# Patient Record
Sex: MALE | Race: BLACK OR AFRICAN AMERICAN | NOT HISPANIC OR LATINO | Employment: UNEMPLOYED | URBAN - METROPOLITAN AREA
[De-identification: names, ages, dates, MRNs, and addresses within clinical notes are randomized per-mention and may not be internally consistent; named-entity substitution may affect disease eponyms.]

---

## 2017-08-24 ENCOUNTER — HOSPITAL ENCOUNTER (EMERGENCY)
Facility: CLINIC | Age: 22
Discharge: HOME | End: 2017-08-25
Attending: EMERGENCY MEDICINE | Admitting: EMERGENCY MEDICINE
Payer: COMMERCIAL

## 2017-08-24 DIAGNOSIS — F15.10 METHAMPHETAMINE ABUSE (H): ICD-10-CM

## 2017-08-24 LAB
ALCOHOL BREATH TEST: 0 (ref 0–0.01)
AMPHETAMINES UR QL SCN: POSITIVE
BARBITURATES UR QL: NEGATIVE
BENZODIAZ UR QL: NEGATIVE
CANNABINOIDS UR QL SCN: NEGATIVE
COCAINE UR QL: NEGATIVE
OPIATES UR QL SCN: NEGATIVE
PCP UR QL SCN: NEGATIVE

## 2017-08-24 PROCEDURE — 82075 ASSAY OF BREATH ETHANOL: CPT

## 2017-08-24 PROCEDURE — 96361 HYDRATE IV INFUSION ADD-ON: CPT

## 2017-08-24 PROCEDURE — 99283 EMERGENCY DEPT VISIT LOW MDM: CPT | Mod: 25

## 2017-08-24 PROCEDURE — 80307 DRUG TEST PRSMV CHEM ANLYZR: CPT | Performed by: EMERGENCY MEDICINE

## 2017-08-24 PROCEDURE — 25000128 H RX IP 250 OP 636: Performed by: EMERGENCY MEDICINE

## 2017-08-24 PROCEDURE — 96360 HYDRATION IV INFUSION INIT: CPT

## 2017-08-24 RX ORDER — LORAZEPAM 2 MG/ML
1 INJECTION INTRAMUSCULAR ONCE
Status: DISCONTINUED | OUTPATIENT
Start: 2017-08-24 | End: 2017-08-25 | Stop reason: HOSPADM

## 2017-08-24 RX ADMIN — SODIUM CHLORIDE 1000 ML: 9 INJECTION, SOLUTION INTRAVENOUS at 22:44

## 2017-08-24 ASSESSMENT — ENCOUNTER SYMPTOMS
HYPERACTIVE: 0
CONFUSION: 1
TREMORS: 1
AGITATION: 0
NERVOUS/ANXIOUS: 1
NAUSEA: 0
HALLUCINATIONS: 0
DYSPHORIC MOOD: 1
VOMITING: 0

## 2017-08-24 NOTE — ED AVS SNAPSHOT
[facility] Emergency Department    [address]    [facility] [address]    Phone:  [phone]    Fax:  [phone]                                       Solomon Dubon   MRN: [id number]    Department:  [facility] Emergency Department   Date of Visit:  8/24/2017           After Visit Summary Signature Page     I have received my discharge instructions, and my questions have been answered. I have discussed any challenges I see with this plan with the nurse or doctor.    ..........................................................................................................................................  Patient/Patient Representative Signature      ..........................................................................................................................................  Patient Representative Print Name and Relationship to Patient    ..................................................               ................................................  Date                                            Time    ..........................................................................................................................................  Reviewed by Signature/Title    ...................................................              ..............................................  Date                                                            Time

## 2017-08-24 NOTE — ED AVS SNAPSHOT
" [facility] Emergency Department    [address] [place] [address]    Phone:  [phone]    Fax:  [phone]                                       Solomon Dubon   MRN: [id number]    Department:  [facility] Emergency Department   Date of Visit:  8/24/2017           Patient Information     Date Of Birth          1995        Your diagnoses for this visit were:     Methamphetamine abuse        You were seen by Subhash Sawyer MD.      Follow-up Information     Follow up with detox. Call today.        Discharge Instructions         Understanding Methamphetamine Abuse and Addiction  Methamphetamine (also known as meth or crystal meth) is a manmade drug that affects brain function. Over time, it can change the way you think and act. Some of these changes can cause you great distress. And they can disrupt your life. But methamphetamine addiction can be treated. If you or a loved one has a drug problem, tell someone you trust. That is the first step in getting help.    What does methamphetamine do?  Some drugs slow down your system. But methamphetamine speeds it up. In fact, methamphetamine is often known as  speed,  or \"crank,\" Users have increased energy. Some may go days without food or sleep. The drug comes in many forms that users inject, smoke, inhale, or eat. Methamphetamine causes an intense rush that may last from minutes to hours.  What are the risks?  Methamphetamine triggers your brain to release large amounts of the chemical dopamine. This causes feelings of extreme well-being. It may also damage the cells that produce dopamine. This can make it harder to feel pleasure over time. Using methamphetamine may also lead to these problems:    Addiction. This means you develop a strong physical and psychological dependence on the drug. And you may not be able to stop taking it on your own. A potent form of methamphetamine known as  ice  or \"crystal meth\" is even " more addictive.    Overdose. You may need more and more methamphetamine to feel good. But taking too much can lead to seizures or death.    Exposure to HIV. Using shared needles to inject methamphetamine can spread the virus that cause AIDS and hepatitis.    Hallucinations (hearing and seeing things that aren t there).    Paranoia (intense feelings of fear of other people).    Violent action    Bleeding in the brain    Severe dental problems  How can you get help?  In many cases, your healthcare provider can help. Or, check your phone book or the Internet for mental health centers and drug treatment programs. You can also try the resources below.  Resources  Substance Abuse and Mental Health Services Helpline  552.711.2812 (HELP) http://www.samhsa.gov/treatment/   The National [place] on Drug Abuse  832.968.5759  www.drugabuse.gov/drugs-abuse   Crystal Meth Anonymous 223-383-2317 www.crystalmeth.org    Date Last Reviewed: 2/1/2017 2000-2017 [employer]. [address] [place] [address]. All rights reserved. This information is not intended as a substitute for professional medical care. Always follow your healthcare professional's instructions.          24 Hour Appointment Hotline       To make an appointment at any [facility], call [phone] ([phone]). If you don't have a family doctor or clinic, we will help you find one. [facility] clinics are conveniently located to serve the needs of you and your family.             Review of your medicines      Notice     You have not been prescribed any medications.            Procedures and tests performed during your visit     Alcohol breath test POCT    Drug abuse screen 77 urine      Orders Needing Specimen Collection     None      Pending Results     No orders found for last 3 day(s).            Pending Culture Results     No orders found for last 3 day(s).            Pending Results Instructions     If you had any lab results  that were not finalized at the time of your Discharge, you can call the ED Lab Result RN at [phone]. You will be contacted by this team for any positive Lab results or changes in treatment. The nurses are available 7 days a week from 10A to 6:30P.  You can leave a message 24 hours per day and they will return your call.        Test Results From Your Hospital Stay        8/24/2017 11:05 PM      Component Results     Component Value Ref Range & Units Status    Alcohol Breath Test 0.000 0.00 - 0.01 Final         8/24/2017 11:28 PM      Component Results     Component Value Ref Range & Units Status    Amphetamine Qual Urine Positive (A) NEG^Negative Final    Cutoff for a positive amphetamine is greater than 500 ng/mL. This is an   unconfirmed screening result to be used for medical purposes only.      Barbiturates Qual Urine Negative NEG^Negative Final    Cutoff for a negative barbiturate is 200 ng/mL or less.    Benzodiazepine Qual Urine Negative NEG^Negative Final    Cutoff for a negative benzodiazepine is 200 ng/mL or less.    Cannabinoids Qual Urine Negative NEG^Negative Final    Cutoff for a negative cannabinoid is 50 ng/mL or less.    Cocaine Qual Urine Negative NEG^Negative Final    Cutoff for a negative cocaine is 300 ng/mL or less.    Opiates Qualitative Urine Negative NEG^Negative Final    Cutoff for a negative opiate is 300 ng/mL or less.    PCP Qual Urine Negative NEG^Negative Final    Cutoff for a negative PCP is 25 ng/mL or less.                Clinical Quality Measure: Blood Pressure Screening     Your blood pressure was checked while you were in the emergency department today. The last reading we obtained was  BP: 124/82 . Please read the guidelines below about what these numbers mean and what you should do about them.  If your systolic blood pressure (the top number) is less than 120 and your diastolic blood pressure (the bottom number) is less than 80, then your blood pressure is normal. There is  "nothing more that you need to do about it.  If your systolic blood pressure (the top number) is 120-139 or your diastolic blood pressure (the bottom number) is 80-89, your blood pressure may be higher than it should be. You should have your blood pressure rechecked within a year by a primary care provider.  If your systolic blood pressure (the top number) is 140 or greater or your diastolic blood pressure (the bottom number) is 90 or greater, you may have high blood pressure. High blood pressure is treatable, but if left untreated over time it can put you at risk for heart attack, stroke, or kidney failure. You should have your blood pressure rechecked by a primary care provider within the next 4 weeks.  If your provider in the emergency department today gave you specific instructions to follow-up with your doctor or provider even sooner than that, you should follow that instruction and not wait for up to 4 weeks for your follow-up visit.        Thank you for choosing [facility]       Thank you for choosing [facility] for your care. Our goal is always to provide you with excellent care. Hearing back from our patients is one way we can continue to improve our services. Please take a few minutes to complete the written survey that you may receive in the mail after you visit with us. Thank you!        [employer] Information     [employer] lets you send messages to your doctor, view your test results, renew your prescriptions, schedule appointments and more. To sign up, go to [email or web address] . Click on \"Log in\" on the left side of the screen, which will take you to the Welcome page. Then click on \"Sign up Now\" on the right side of the page.     You will be asked to enter the access code listed below, as well as some personal information. Please follow the directions to create your username and password.     Your access code is: [email or web address]  Expires: 2017  5:39 AM     Your access code will  in 90 days. If you " need help or a new code, please call your [facility] clinic or [phone].        Care EveryWhere ID     This is your Care EveryWhere ID. This could be used by other organizations to access your [facility] medical records  [id number]        Equal Access to Services     JEFFERY ROSE : Rishabh Curtis, desi yoo, keerthi kaalgumaro galvan, kamar loja. So [facility] [phone].    ATENCIÓN: Si habla español, tiene a kaur disposición servicios gratuitos de asistencia lingüística. Llame al [phone].    We comply with applicable federal civil rights laws and [place] laws. We do not discriminate on the basis of race, color, national origin, age, disability sex, sexual orientation or gender identity.            After Visit Summary       This is your record. Keep this with you and show to your community pharmacist(s) and doctor(s) at your next visit.

## 2017-08-25 VITALS
DIASTOLIC BLOOD PRESSURE: 80 MMHG | SYSTOLIC BLOOD PRESSURE: 126 MMHG | OXYGEN SATURATION: 99 % | RESPIRATION RATE: 16 BRPM | HEART RATE: 81 BPM

## 2017-08-25 NOTE — ED NOTES
Bed: ED15  Expected date: 8/24/17  Expected time: 9:30 PM  Means of arrival: Ambulance  Comments:  Shavon August

## 2017-08-25 NOTE — ED NOTES
Bed: ED04  Expected date: 8/24/17  Expected time: 10:04 PM  Means of arrival:   Comments:  Room 15?

## 2017-08-25 NOTE — ED PROVIDER NOTES
History     Chief Complaint:  Intoxication    History limited by: patient is poor historian.      Solomon Dubon is a 21 year old male with HIV, on medication for this, who presents with intoxication. The patient was brought in via EMS after responding to a call from the patient's mother. EMS reports that the patient reportedly was acting unusual from baseline after smoking methamphetamine earlier today with his most recent intake at 1400. Since then the patient has been acting delirious, is unaware of his location, and has been having involuntary body jerks. The patient reports that he has smoked meth before and never had these complications with it; he denies any hallucinations, ingesting any other drugs, and had no other complaints or symptoms.    Allergies:  No known drug allergies.    Medications:    The patient is currently on no regular medications.     Past Medical History:    HIV Positive    Past Surgical History:    No pertinent past surgical history.    Family History:    No pertinent family history.    Social History:  Smoking status: Current smoker  Alcohol use: Yes  Recurrent Meth user  Marital Status:  Single      Review of Systems   Gastrointestinal: Negative for nausea and vomiting.   Neurological: Positive for tremors. Negative for syncope.   Psychiatric/Behavioral: Positive for confusion and dysphoric mood. Negative for agitation, hallucinations, self-injury and suicidal ideas. The patient is nervous/anxious. The patient is not hyperactive.    All other systems reviewed and are negative.      Physical Exam     Patient Vitals for the past 24 hrs:   BP Pulse Heart Rate Resp SpO2   08/25/17 0328 124/82 - - - -   08/25/17 0220 118/72 - - - -   08/24/17 2152 119/80 81 81 16 99 %       Physical Exam  Constitutional:  Oriented to person, place, and time. Anxious with involuntary jerks noted and equal in all 4 extremities, awake and alert  HENT:   Head:    Normocephalic.   Mouth/Throat:   Oropharynx is  clear and moist.   Eyes:    EOM are normal. Pupils are equal, round, and reactive to light.   Neck:    Neck supple.   Cardiovascular:  Normal rate, regular rhythm and normal heart sounds.      Exam reveals no gallop and no friction rub.       No murmur heard.  Pulmonary/Chest:  Effort normal and breath sounds normal.      No respiratory distress. No wheezes. No rales.      No reproducible chest wall pain.  Abdominal:   Soft. No distension. No tenderness. No rebound and no guarding.   Musculoskeletal:  Normal range of motion.   Neurological:   Awake, Alert and oriented to person, place, and time.           Moves all 4 extremities spontaneously    Skin:    No rash noted. No pallor.   Psych:   Intoxicated, no suicidal ideation    Emergency Department Course   Laboratory:  Drug Abuse screen: Amphetamine positive, otherwise negative    Alcohol breath: 0.000     Interventions:  (2247) Lorazepam, 1 mg, IV injection  (0113) Normal Saline, 1 liter, IV bolus    Emergency Department Course:  Nursing notes and vitals reviewed.  (8619) I performed an exam of the patient as documented above.    Findings and plan explained to the patient. Patient discharged home with instructions regarding supportive care, medications, and reasons to return. The importance of close follow-up was reviewed.     Impression & Plan      Medical Decision Making:  Solomon Dubon is a 21 year old male who presents for evaluation of methamphetamine abuse.  They have been using methamphetamine and signs and symptoms are consistent with drug intoxication.  A broad differential diagnosis was considered including acute infection, metabolic derangement, intracerebral issue (stroke, bleed, tumor, encephalitis, meningitis), medication side effect, psychiatric illness, etc.  Given course in Emergency Department and exam and discharge, I doubt at this time there are serious underlying etiologies for the patients symptoms.  I offered the patient DEC/  eval and they refused.  Plan is close follow-up of primary care physician for further abuse counseling and referral.      Diagnosis:    ICD-10-CM   1. Methamphetamine abuse F15.10       Disposition:  Patient is discharged to home.      Delvis Yepez  8/24/2017   [facility] EMERGENCY DEPARTMENT    I, Delvis Yepez, am serving as a scribe on 8/24/2017 at 10:11 PM to personally document services performed by Dr. Sawyer based on my observations and the provider's statements to me.       Subhash Sawyer MD  08/25/17 0603

## 2017-08-25 NOTE — DISCHARGE INSTRUCTIONS
"  Understanding Methamphetamine Abuse and Addiction  Methamphetamine (also known as meth or crystal meth) is a manmade drug that affects brain function. Over time, it can change the way you think and act. Some of these changes can cause you great distress. And they can disrupt your life. But methamphetamine addiction can be treated. If you or a loved one has a drug problem, tell someone you trust. That is the first step in getting help.    What does methamphetamine do?  Some drugs slow down your system. But methamphetamine speeds it up. In fact, methamphetamine is often known as  speed,  or \"crank,\" Users have increased energy. Some may go days without food or sleep. The drug comes in many forms that users inject, smoke, inhale, or eat. Methamphetamine causes an intense rush that may last from minutes to hours.  What are the risks?  Methamphetamine triggers your brain to release large amounts of the chemical dopamine. This causes feelings of extreme well-being. It may also damage the cells that produce dopamine. This can make it harder to feel pleasure over time. Using methamphetamine may also lead to these problems:    Addiction. This means you develop a strong physical and psychological dependence on the drug. And you may not be able to stop taking it on your own. A potent form of methamphetamine known as  ice  or \"crystal meth\" is even more addictive.    Overdose. You may need more and more methamphetamine to feel good. But taking too much can lead to seizures or death.    Exposure to HIV. Using shared needles to inject methamphetamine can spread the virus that cause AIDS and hepatitis.    Hallucinations (hearing and seeing things that aren t there).    Paranoia (intense feelings of fear of other people).    Violent action    Bleeding in the brain    Severe dental problems  How can you get help?  In many cases, your healthcare provider can help. Or, check your phone book or the Internet for mental health centers " and drug treatment programs. You can also try the resources below.  Resources  Substance Abuse and Mental Health Services Helpline  667.632.5007 (HELP) http://www.samhsa.gov/treatment/   The National [place] on Drug Abuse  109.579.9295  www.drugabuse.gov/drugs-abuse   Crystal Meth Anonymous 830-713-3954 www.crystalmeth.org    Date Last Reviewed: 2/1/2017 2000-2017 [employer]. [address] [place] [address], [place] [address]. All rights reserved. This information is not intended as a substitute for professional medical care. Always follow your healthcare professional's instructions.

## 2017-08-25 NOTE — ED NOTES
"Ambulated patient through [facility] without difficulty.  MD notified.  Patient called somebody by the name of \"Alex\" to pick him up.  "

## 2017-08-25 NOTE — ED NOTES
Pt smoked meth today at 10 am and then at noon,has not been himself. ABC's intact A&O to time and date. HIV positive.

## 2018-04-11 ENCOUNTER — HOSPITAL ENCOUNTER (EMERGENCY)
Facility: CLINIC | Age: 23
Discharge: HOME | End: 2018-04-12
Attending: EMERGENCY MEDICINE | Admitting: EMERGENCY MEDICINE
Payer: COMMERCIAL

## 2018-04-11 DIAGNOSIS — S61.411A LACERATION OF RIGHT HAND WITHOUT FOREIGN BODY, INITIAL ENCOUNTER: ICD-10-CM

## 2018-04-11 DIAGNOSIS — F15.10 METHAMPHETAMINE ABUSE (H): ICD-10-CM

## 2018-04-11 PROCEDURE — 93005 ELECTROCARDIOGRAM TRACING: CPT

## 2018-04-11 PROCEDURE — 96374 THER/PROPH/DIAG INJ IV PUSH: CPT

## 2018-04-11 PROCEDURE — 96361 HYDRATE IV INFUSION ADD-ON: CPT

## 2018-04-11 PROCEDURE — 90791 PSYCH DIAGNOSTIC EVALUATION: CPT

## 2018-04-11 PROCEDURE — 99285 EMERGENCY DEPT VISIT HI MDM: CPT | Mod: 25

## 2018-04-11 RX ORDER — LORAZEPAM 2 MG/ML
1 INJECTION INTRAMUSCULAR ONCE
Status: COMPLETED | OUTPATIENT
Start: 2018-04-11 | End: 2018-04-12

## 2018-04-11 RX ORDER — SODIUM CHLORIDE 9 MG/ML
1000 INJECTION, SOLUTION INTRAVENOUS CONTINUOUS
Status: DISCONTINUED | OUTPATIENT
Start: 2018-04-11 | End: 2018-04-12 | Stop reason: HOSPADM

## 2018-04-11 NOTE — LETTER
April 12, 2018      To Whom It May Concern:      Solomon Dubon was seen in our Emergency Department today, 04/12/18.       Sincerely,        Guiterrez Talamantes MD

## 2018-04-11 NOTE — ED AVS SNAPSHOT
[facility] Emergency Department    [address]    [facility] [address]    Phone:  [phone]    Fax:  [phone]                                       Solomon Dubon   MRN: [id number]    Department:  [facility] Emergency Department   Date of Visit:  4/11/2018           After Visit Summary Signature Page     I have received my discharge instructions, and my questions have been answered. I have discussed any challenges I see with this plan with the nurse or doctor.    ..........................................................................................................................................  Patient/Patient Representative Signature      ..........................................................................................................................................  Patient Representative Print Name and Relationship to Patient    ..................................................               ................................................  Date                                            Time    ..........................................................................................................................................  Reviewed by Signature/Title    ...................................................              ..............................................  Date                                                            Time

## 2018-04-11 NOTE — ED AVS SNAPSHOT
[facility] Emergency Department    [address] [place] [address]    Phone:  [phone]    Fax:  [phone]                                       Solomon Dubon   MRN: [id number]    Department:  [facility] Emergency Department   Date of Visit:  4/11/2018           Patient Information     Date Of Birth          1995        Your diagnoses for this visit were:     Methamphetamine abuse     Laceration of right hand without foreign body, initial encounter        You were seen by Subhash Sawyer MD and Gutierrez Talamantes MD.        Discharge Instructions       Please make an appointment to follow up with your primary care provider in 2-3 days if not improving.      Return to ER immediately if you develop: redness around hand wound, Fever > 101, persistent nausea or vomiting OR you have any other concerns about your health.        Drug Abuse  Use and abuse of drugs like marijuana, amphetamines (speed, crank), cocaine, heroin or prescription pain medicines, sedatives and sleeping pills, MDMA, ecstasy, bath salts, PCP, mescaline and LSD may lead to addiction or dependence. Once this happens, you are at greater risk for any of the following:  Social and personal problems    Craving for the drug and not able to stop using even though you think you want to stop (psychological addiction)    Drug withdrawal symptoms if you stop taking the drug (physical dependence)    Loss of job or your family    Arrest, conviction, and [facility]DC sentence for possession of an illegal substance or for driving under the influence  Health problems    Strokes, heart attacks, kidney failure    Accidental injuries to yourself or others while you are under the influence of the drug (in a car or at home)    HIV infection (much greater risk if you use IV drugs)    Skin infections    Other sexually transmitted disease (STDs such as herpes, chlamydia, gonorrhea, and others)    Severe and fatal  infection of the heart valves (if you use IV drugs)    Hepatitis B or C    Death from overdose  Home care  The following suggestions can help you care for yourself at home:    Admit you have a drug problem. Ask for help from your family and close friends.    Seek professional help. This could be in the form of individual psychotherapy or counseling. There are also outpatient, inpatient, and residential drug treatment programs.    Join a self-help group for drug abuse.    Stay away from friends who abuse drugs or tempt you to continue abusing drugs.    Eat a balanced diet and start a regular exercise program.  Follow-up care  Follow up with your healthcare provider, or as advised. Contact one of the resources below for help:    National [place] on Alcoholism and Drug Dependence  www.ncadd.org  853.752.6002    Narcotics Anonymous  www.na.org  114.239.6993    National Alcohol and Substance Abuse Information Center (for referral to treatment programs)  www.[employer]  435.798.1464  Call 911  Call 911 if any of the following occur:    Seizure    Hard time breathing or slow, irregular breathing    Chest pain    Sudden weakness on one side of your body or sudden trouble speaking    Very drowsy or trouble awakening    Fainting or loss of consciousness    Rapid heart rate    Very slow heart rate  When to seek medical advice  Call your healthcare provider right away if any of these occur:    Agitation, anxiety, unable to sleep    Unintended weight loss (more than 10 to 15 pounds over 3 months)    Fever of 100.4 F (38 C) or higher, or as directed by your healthcare provider    Shortness of breath    Cough with colored sputum    Redness, swelling, or tenderness at an injection site  Date Last Reviewed: 6/1/2016 2000-2017 [employer]. [address] [place] [address]. All rights reserved. This information is not intended as a substitute for professional medical care. Always follow your  healthcare professional's instructions.          Old Laceration: Not Sutured  A laceration is a cut through the skin. This will usually require stitches if it is deep. However, if a laceration remains open for too long, the risk of infection increases. In your case, too much time has passed before coming for treatment. The danger of infection from stitching at this time is too high. That is why your wound was not stitched.  If the wound is spread open, it will heal by filling in from the bottom and sides. A wound that is not stitched may take 1 to 4 weeks to heal, depending on the size of the opening. You will probably have a visible scar. You can discuss revision of the scar with your healthcare provider at a later time.  Home care  The following guidelines will help you care for your laceration at home:    Keep the wound clean and dry. If a bandage was applied and it becomes wet or dirty, replace it. Otherwise, leave it in place for the first 24 hours, then change it once a day or as directed.    Clean the wound daily:    After removing any bandage, wash the area with soap and water. Use a wet cotton swab to loosen and remove any blood or crust that forms.    Talk with your healthcare provider before applying any antibiotic ointment to the wound. Reapply a fresh bandage.    You may remove the bandage to shower as usual after the first 24 hours, but don't soak the area in water (no tub baths or swimming) until the wound heals or your provider tells you it's OK.  Avoid activities that may reinjure your wound.    The healthcare provider may prescribe an antibiotic cream or ointment to prevent infection.     If you are at high risk for infection, or the wound is grossly contaminated, your provider may prescribe an oral antibiotic medicine to prevent infection. Don't stop using this medicine until you have finished the prescribed course, or the provider tells you to stop.    The healthcare provider may also prescribe  medicine for pain. Follow instructions for taking these medicines.    Check the wound daily for signs of infection listed below.  Follow-up care  Follow up with your healthcare provider, or as advised.  When to seek medical advice  Call your healthcare provider right away if any of these occur:    Wound bleeding not controlled by direct pressure    Signs of infection, including increasing pain in the wound, increasing wound redness or swelling, or pus or bad odor coming from the wound    Fever of 100.4 F (38. C) or higher or as directed by your healthcare provider    Wound edges re-open    Wound changes colors    Numbness around the wound     Decreased movement around the injured area  Date Last Reviewed: 6/10/2015    2378-5852 [employer]. [address] [place] [address], [place] [address]. All rights reserved. This information is not intended as a substitute for professional medical care. Always follow your healthcare professional's instructions.            24 Hour Appointment Hotline       To make an appointment at any [facility], call [phone] ([phone]). If you don't have a family doctor or clinic, we will help you find one. [facility] clinics are conveniently located to serve the needs of you and your family.             Review of your medicines      Notice     You have not been prescribed any medications.            Procedures and tests performed during your visit     Acetaminophen level    Alcohol ethyl    CBC with platelets differential    Comprehensive metabolic panel    Drug abuse screen 77 urine (W[place],RH,SH)    EKG 12-lead, tracing only    Peripheral IV: Standard    Pulse oximetry nursing    Restraints Violent or Self-Destructive Adult (Age 18 or Older)    Salicylate level      Orders Needing Specimen Collection     None      Pending Results     No orders found for last 3 day(s).            Pending Culture Results     No orders found for last 3 day(s).            Pending Results  Instructions     If you had any lab results that were not finalized at the time of your Discharge, you can call the ED Lab Result RN at [phone]. You will be contacted by this team for any positive Lab results or changes in treatment. The nurses are available 7 days a week from 10A to 6:30P.  You can leave a message 24 hours per day and they will return your call.        Test Results From Your Hospital Stay        4/12/2018  2:53 AM      Component Results     Component Value Ref Range & Units Status    Amphetamine Qual Urine Positive (A) NEG^Negative Final    Cutoff for a positive amphetamine is greater than 500 ng/mL. This is an   unconfirmed screening result to be used for medical purposes only.      Barbiturates Qual Urine Negative NEG^Negative Final    Cutoff for a negative barbiturate is 200 ng/mL or less.    Benzodiazepine Qual Urine Negative NEG^Negative Final    Cutoff for a negative benzodiazepine is 200 ng/mL or less.    Cannabinoids Qual Urine Positive (A) NEG^Negative Final    Cutoff for a positive cannabinoid is greater than 50 ng/mL. This is an   unconfirmed screening result to be used for medical purposes only.      Cocaine Qual Urine Negative NEG^Negative Final    Cutoff for a negative cocaine is 300 ng/mL or less.    Opiates Qualitative Urine Negative NEG^Negative Final    Cutoff for a negative opiate is 300 ng/mL or less.    PCP Qual Urine Negative NEG^Negative Final    Cutoff for a negative PCP is 25 ng/mL or less.         4/12/2018 12:50 AM      Component Results     Component Value Ref Range & Units Status    Acetaminophen Level <2 mg/L Final    Therapeutic range: 10-20 mg/L         4/12/2018 12:34 AM      Component Results     Component Value Ref Range & Units Status    Ethanol g/dL <0.01 <0.01 g/dL Final         4/12/2018 12:18 AM      Component Results     Component Value Ref Range & Units Status    WBC 6.6 4.0 - 11.0 10e9/L Final    RBC Count 4.64 4.4 - 5.9 10e12/L Final    Hemoglobin  14.9 13.3 - 17.7 g/dL Final    Hematocrit 42.6 40.0 - 53.0 % Final    MCV 92 78 - 100 fl Final    MCH 32.1 26.5 - 33.0 pg Final    MCHC 35.0 31.5 - 36.5 g/dL Final    RDW 13.1 10.0 - 15.0 % Final    Platelet Count 272 150 - 450 10e9/L Final    Diff Method Automated Method  Final    % Neutrophils 65.6 % Final    % Lymphocytes 23.8 % Final    % Monocytes 7.9 % Final    % Eosinophils 2.1 % Final    % Basophils 0.3 % Final    % Immature Granulocytes 0.3 % Final    Nucleated RBCs 0 0 /100 Final    Absolute Neutrophil 4.3 1.6 - 8.3 10e9/L Final    Absolute Lymphocytes 1.6 0.8 - 5.3 10e9/L Final    Absolute Monocytes 0.5 0.0 - 1.3 10e9/L Final    Absolute Eosinophils 0.1 0.0 - 0.7 10e9/L Final    Absolute Basophils 0.0 0.0 - 0.2 10e9/L Final    Abs Immature Granulocytes 0.0 0 - 0.4 10e9/L Final    Absolute Nucleated RBC 0.0  Final         4/12/2018 12:34 AM      Component Results     Component Value Ref Range & Units Status    Sodium 138 133 - 144 mmol/L Final    Potassium 4.1 3.4 - 5.3 mmol/L Final    Chloride 107 94 - 109 mmol/L Final    Carbon Dioxide 26 20 - 32 mmol/L Final    Anion Gap 5 3 - 14 mmol/L Final    Glucose 87 70 - 99 mg/dL Final    Urea Nitrogen 15 7 - 30 mg/dL Final    Creatinine 1.24 0.66 - 1.25 mg/dL Final    GFR Estimate 72 >60 mL/min/1.7m2 Final    Non  GFR Calc    GFR Estimate If Black 87 >60 mL/min/1.7m2 Final    African American GFR Calc    Calcium 8.7 8.5 - 10.1 mg/dL Final    Bilirubin Total 0.4 0.2 - 1.3 mg/dL Final    Albumin 4.0 3.4 - 5.0 g/dL Final    Protein Total 7.5 6.8 - 8.8 g/dL Final    Alkaline Phosphatase 101 40 - 150 U/L Final    ALT 37 0 - 70 U/L Final    AST 50 (H) 0 - 45 U/L Final         4/12/2018 12:46 AM      Component Results     Component Value Ref Range & Units Status    Salicylate Level <2 mg/dL Final    Therapeutic:        <20  Anti inflammatory:  15-30                  Clinical Quality Measure: Blood Pressure Screening     Your blood pressure was checked  "while you were in the emergency department today. The last reading we obtained was  BP: 133/70 . Please read the guidelines below about what these numbers mean and what you should do about them.  If your systolic blood pressure (the top number) is less than 120 and your diastolic blood pressure (the bottom number) is less than 80, then your blood pressure is normal. There is nothing more that you need to do about it.  If your systolic blood pressure (the top number) is 120-139 or your diastolic blood pressure (the bottom number) is 80-89, your blood pressure may be higher than it should be. You should have your blood pressure rechecked within a year by a primary care provider.  If your systolic blood pressure (the top number) is 140 or greater or your diastolic blood pressure (the bottom number) is 90 or greater, you may have high blood pressure. High blood pressure is treatable, but if left untreated over time it can put you at risk for heart attack, stroke, or kidney failure. You should have your blood pressure rechecked by a primary care provider within the next 4 weeks.  If your provider in the emergency department today gave you specific instructions to follow-up with your doctor or provider even sooner than that, you should follow that instruction and not wait for up to 4 weeks for your follow-up visit.        Thank you for choosing [facility]       Thank you for choosing [facility] for your care. Our goal is always to provide you with excellent care. Hearing back from our patients is one way we can continue to improve our services. Please take a few minutes to complete the written survey that you may receive in the mail after you visit with us. Thank you!        [employer] Information     [facility] [employer] lets you send messages to your doctor, view your test results, renew your prescriptions, schedule appointments and more. To sign up, go to [email or web address] . Click on \"Log in\" on the left side of the screen, which " "will take you to the Welcome page. Then click on \"Sign up Now\" on the right side of the page.     You will be asked to enter the access code listed below, as well as some personal information. Please follow the directions to create your username and password.     Your access code is: [email or web address]  Expires: 2018  2:55 AM     Your access code will  in 90 days. If you need help or a new code, please call your [facility] clinic or [phone].        Care EveryWhere ID     This is your Care EveryWhere ID. This could be used by other organizations to access your [facility] medical records  [id number]        Equal Access to Services     JEFFERY ROSE : Rishabh Curtis, desi yoo, keerthi galvan, kamar loja. So [facility] [phone].    ATENCIÓN: Si habla español, tiene a kaur disposición servicios gratuitos de asistencia lingüística. Llame al [phone].    We comply with applicable federal civil rights laws and [place] laws. We do not discriminate on the basis of race, color, national origin, age, disability, sex, sexual orientation, or gender identity.            After Visit Summary       This is your record. Keep this with you and show to your community pharmacist(s) and doctor(s) at your next visit.                  "

## 2018-04-12 VITALS
RESPIRATION RATE: 20 BRPM | SYSTOLIC BLOOD PRESSURE: 132 MMHG | TEMPERATURE: 97.7 F | OXYGEN SATURATION: 98 % | HEART RATE: 90 BPM | DIASTOLIC BLOOD PRESSURE: 76 MMHG

## 2018-04-12 LAB
ALBUMIN SERPL-MCNC: 4 G/DL (ref 3.4–5)
ALP SERPL-CCNC: 101 U/L (ref 40–150)
ALT SERPL W P-5'-P-CCNC: 37 U/L (ref 0–70)
AMPHETAMINES UR QL SCN: POSITIVE
ANION GAP SERPL CALCULATED.3IONS-SCNC: 5 MMOL/L (ref 3–14)
APAP SERPL-MCNC: <2 MG/L (ref 10–20)
AST SERPL W P-5'-P-CCNC: 50 U/L (ref 0–45)
BARBITURATES UR QL: NEGATIVE
BASOPHILS # BLD AUTO: 0 10E9/L (ref 0–0.2)
BASOPHILS NFR BLD AUTO: 0.3 %
BENZODIAZ UR QL: NEGATIVE
BILIRUB SERPL-MCNC: 0.4 MG/DL (ref 0.2–1.3)
BUN SERPL-MCNC: 15 MG/DL (ref 7–30)
CALCIUM SERPL-MCNC: 8.7 MG/DL (ref 8.5–10.1)
CANNABINOIDS UR QL SCN: POSITIVE
CHLORIDE SERPL-SCNC: 107 MMOL/L (ref 94–109)
CO2 SERPL-SCNC: 26 MMOL/L (ref 20–32)
COCAINE UR QL: NEGATIVE
CREAT SERPL-MCNC: 1.24 MG/DL (ref 0.66–1.25)
DIFFERENTIAL METHOD BLD: NORMAL
EOSINOPHIL # BLD AUTO: 0.1 10E9/L (ref 0–0.7)
EOSINOPHIL NFR BLD AUTO: 2.1 %
ERYTHROCYTE [DISTWIDTH] IN BLOOD BY AUTOMATED COUNT: 13.1 % (ref 10–15)
ETHANOL SERPL-MCNC: <0.01 G/DL
GFR SERPL CREATININE-BSD FRML MDRD: 72 ML/MIN/1.7M2
GLUCOSE SERPL-MCNC: 87 MG/DL (ref 70–99)
HCT VFR BLD AUTO: 42.6 % (ref 40–53)
HGB BLD-MCNC: 14.9 G/DL (ref 13.3–17.7)
IMM GRANULOCYTES # BLD: 0 10E9/L (ref 0–0.4)
IMM GRANULOCYTES NFR BLD: 0.3 %
INTERPRETATION ECG - MUSE: NORMAL
LYMPHOCYTES # BLD AUTO: 1.6 10E9/L (ref 0.8–5.3)
LYMPHOCYTES NFR BLD AUTO: 23.8 %
MCH RBC QN AUTO: 32.1 PG (ref 26.5–33)
MCHC RBC AUTO-ENTMCNC: 35 G/DL (ref 31.5–36.5)
MCV RBC AUTO: 92 FL (ref 78–100)
MONOCYTES # BLD AUTO: 0.5 10E9/L (ref 0–1.3)
MONOCYTES NFR BLD AUTO: 7.9 %
NEUTROPHILS # BLD AUTO: 4.3 10E9/L (ref 1.6–8.3)
NEUTROPHILS NFR BLD AUTO: 65.6 %
NRBC # BLD AUTO: 0 10*3/UL
NRBC BLD AUTO-RTO: 0 /100
OPIATES UR QL SCN: NEGATIVE
PCP UR QL SCN: NEGATIVE
PLATELET # BLD AUTO: 272 10E9/L (ref 150–450)
POTASSIUM SERPL-SCNC: 4.1 MMOL/L (ref 3.4–5.3)
PROT SERPL-MCNC: 7.5 G/DL (ref 6.8–8.8)
RBC # BLD AUTO: 4.64 10E12/L (ref 4.4–5.9)
SALICYLATES SERPL-MCNC: <2 MG/DL
SODIUM SERPL-SCNC: 138 MMOL/L (ref 133–144)
WBC # BLD AUTO: 6.6 10E9/L (ref 4–11)

## 2018-04-12 PROCEDURE — 80329 ANALGESICS NON-OPIOID 1 OR 2: CPT | Performed by: EMERGENCY MEDICINE

## 2018-04-12 PROCEDURE — 85025 COMPLETE CBC W/AUTO DIFF WBC: CPT | Performed by: EMERGENCY MEDICINE

## 2018-04-12 PROCEDURE — 80307 DRUG TEST PRSMV CHEM ANLYZR: CPT | Mod: 59 | Performed by: EMERGENCY MEDICINE

## 2018-04-12 PROCEDURE — 80320 DRUG SCREEN QUANTALCOHOLS: CPT | Performed by: EMERGENCY MEDICINE

## 2018-04-12 PROCEDURE — 80299 QUANTITATIVE ASSAY DRUG: CPT | Mod: 91 | Performed by: EMERGENCY MEDICINE

## 2018-04-12 PROCEDURE — 80053 COMPREHEN METABOLIC PANEL: CPT | Performed by: EMERGENCY MEDICINE

## 2018-04-12 PROCEDURE — 25000128 H RX IP 250 OP 636: Performed by: EMERGENCY MEDICINE

## 2018-04-12 PROCEDURE — 80307 DRUG TEST PRSMV CHEM ANLYZR: CPT | Performed by: EMERGENCY MEDICINE

## 2018-04-12 PROCEDURE — 36415 COLL VENOUS BLD VENIPUNCTURE: CPT | Performed by: EMERGENCY MEDICINE

## 2018-04-12 PROCEDURE — 80329 ANALGESICS NON-OPIOID 1 OR 2: CPT | Mod: 91 | Performed by: EMERGENCY MEDICINE

## 2018-04-12 PROCEDURE — 96374 THER/PROPH/DIAG INJ IV PUSH: CPT

## 2018-04-12 RX ORDER — GINSENG 100 MG
CAPSULE ORAL
Status: DISCONTINUED
Start: 2018-04-12 | End: 2018-04-12 | Stop reason: HOSPADM

## 2018-04-12 RX ADMIN — SODIUM CHLORIDE 1000 ML: 9 INJECTION, SOLUTION INTRAVENOUS at 00:30

## 2018-04-12 RX ADMIN — LORAZEPAM 1 MG: 2 INJECTION INTRAMUSCULAR; INTRAVENOUS at 02:18

## 2018-04-12 RX ADMIN — SODIUM CHLORIDE 1000 ML: 9 INJECTION, SOLUTION INTRAVENOUS at 08:42

## 2018-04-12 NOTE — ED NOTES
Patient with history of meth use, present to ED for not acting appropriately, found to be wandering at [employer] having visual hallucinations. Currently on meds for HIV treatment. Taking coricidin. EMS BS 80. Appears confuse and answering questions semi-appropriately. Was in restraints by ems and transfer on over to ED bed.
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
## 2018-04-12 NOTE — ED NOTES
Spoke with DEC post-consult.  Pt is neither suicidal or homicidal, does not require admission, may be released.  DEC recommending detox and CD treatment.

## 2018-04-12 NOTE — ED NOTES
Restraints removed. Patient remains restless in bed, did gave verbal agreement for cooperation, patient currently able to be redirected, VS remains stable. In no acute distress, blankets provided, side rails up, security on watch, will continue to monitor.

## 2018-04-12 NOTE — ED NOTES
Bed: ED04  Expected date: 4/11/18  Expected time: 11:28 PM  Means of arrival: Ambulance  Comments:  Shavon 597: 23 y/o M

## 2018-04-12 NOTE — DISCHARGE INSTRUCTIONS
Please make an appointment to follow up with your primary care provider in 2-3 days if not improving.      Return to ER immediately if you develop: redness around hand wound, Fever > 101, persistent nausea or vomiting OR you have any other concerns about your health.        Drug Abuse  Use and abuse of drugs like marijuana, amphetamines (speed, crank), cocaine, heroin or prescription pain medicines, sedatives and sleeping pills, MDMA, ecstasy, bath salts, PCP, mescaline and LSD may lead to addiction or dependence. Once this happens, you are at greater risk for any of the following:  Social and personal problems    Craving for the drug and not able to stop using even though you think you want to stop (psychological addiction)    Drug withdrawal symptoms if you stop taking the drug (physical dependence)    Loss of job or your family    Arrest, conviction, and [facility]CI sentence for possession of an illegal substance or for driving under the influence  Health problems    Strokes, heart attacks, kidney failure    Accidental injuries to yourself or others while you are under the influence of the drug (in a car or at home)    HIV infection (much greater risk if you use IV drugs)    Skin infections    Other sexually transmitted disease (STDs such as herpes, chlamydia, gonorrhea, and others)    Severe and fatal infection of the heart valves (if you use IV drugs)    Hepatitis B or C    Death from overdose  Home care  The following suggestions can help you care for yourself at home:    Admit you have a drug problem. Ask for help from your family and close friends.    Seek professional help. This could be in the form of individual psychotherapy or counseling. There are also outpatient, inpatient, and residential drug treatment programs.    Join a self-help group for drug abuse.    Stay away from friends who abuse drugs or tempt you to continue abusing drugs.    Eat a balanced diet and start a regular exercise program.  Follow-up  care  Follow up with your healthcare provider, or as advised. Contact one of the resources below for help:    National [place] on Alcoholism and Drug Dependence  www.ncadd.org  443.255.4419    Narcotics Anonymous  www.na.org  991.805.7431    National Alcohol and Substance Abuse Information Center (for referral to treatment programs)  www.[employer]Grockit  138.152.5685  Call 911  Call 911 if any of the following occur:    Seizure    Hard time breathing or slow, irregular breathing    Chest pain    Sudden weakness on one side of your body or sudden trouble speaking    Very drowsy or trouble awakening    Fainting or loss of consciousness    Rapid heart rate    Very slow heart rate  When to seek medical advice  Call your healthcare provider right away if any of these occur:    Agitation, anxiety, unable to sleep    Unintended weight loss (more than 10 to 15 pounds over 3 months)    Fever of 100.4 F (38 C) or higher, or as directed by your healthcare provider    Shortness of breath    Cough with colored sputum    Redness, swelling, or tenderness at an injection site  Date Last Reviewed: 6/1/2016 2000-2017 [employer]. [address] [place] [address]. All rights reserved. This information is not intended as a substitute for professional medical care. Always follow your healthcare professional's instructions.          Old Laceration: Not Sutured  A laceration is a cut through the skin. This will usually require stitches if it is deep. However, if a laceration remains open for too long, the risk of infection increases. In your case, too much time has passed before coming for treatment. The danger of infection from stitching at this time is too high. That is why your wound was not stitched.  If the wound is spread open, it will heal by filling in from the bottom and sides. A wound that is not stitched may take 1 to 4 weeks to heal, depending on the size of the opening. You will probably  have a visible scar. You can discuss revision of the scar with your healthcare provider at a later time.  Home care  The following guidelines will help you care for your laceration at home:    Keep the wound clean and dry. If a bandage was applied and it becomes wet or dirty, replace it. Otherwise, leave it in place for the first 24 hours, then change it once a day or as directed.    Clean the wound daily:    After removing any bandage, wash the area with soap and water. Use a wet cotton swab to loosen and remove any blood or crust that forms.    Talk with your healthcare provider before applying any antibiotic ointment to the wound. Reapply a fresh bandage.    You may remove the bandage to shower as usual after the first 24 hours, but don't soak the area in water (no tub baths or swimming) until the wound heals or your provider tells you it's OK.  Avoid activities that may reinjure your wound.    The healthcare provider may prescribe an antibiotic cream or ointment to prevent infection.     If you are at high risk for infection, or the wound is grossly contaminated, your provider may prescribe an oral antibiotic medicine to prevent infection. Don't stop using this medicine until you have finished the prescribed course, or the provider tells you to stop.    The healthcare provider may also prescribe medicine for pain. Follow instructions for taking these medicines.    Check the wound daily for signs of infection listed below.  Follow-up care  Follow up with your healthcare provider, or as advised.  When to seek medical advice  Call your healthcare provider right away if any of these occur:    Wound bleeding not controlled by direct pressure    Signs of infection, including increasing pain in the wound, increasing wound redness or swelling, or pus or bad odor coming from the wound    Fever of 100.4 F (38. C) or higher or as directed by your healthcare provider    Wound edges re-open    Wound changes  colors    Numbness around the wound     Decreased movement around the injured area  Date Last Reviewed: 6/10/2015    6148-1140 [employer]. [address] [place] [address], [place] [address]. All rights reserved. This information is not intended as a substitute for professional medical care. Always follow your healthcare professional's instructions.

## 2018-04-12 NOTE — ED PROVIDER NOTES
History     Chief Complaint:  Psychiatric Evaluation    The history is provided by the EMS personnel. The history is limited by the condition of the patient.      Solomon Dubon is an HIV-positive 22-year-old male with a history of meth use who presents via EMS after he was found wandering at a [employer] store.  He was having visual hallucinations reportedly.  For EMS, the patient appeared confused and he was answering questions semi-appropriately.  He was placed in restraints by EMS.  Patient admitted to drinking alcohol to nursing staff.      Allergies:  No known drug allergies.     Medications:    No daily medications.     Past Medical History:    HIV positive   Methamphetamine use     Past Surgical History:    Surgical history reviewed. No pertinent surgical history.    Family History:    History reviewed. No pertinent family history.     Social History:  Marital Status: Single  Presents to the ED via EMS     Review of Systems   Unable to perform ROS: Mental status change     Physical Exam   Patient Vitals for the past 24 hrs:   BP Temp Temp src Pulse Heart Rate Resp SpO2   04/12/18 0348 148/86 - - - - 22 98 %   04/12/18 0330 196/59 - - - - - 94 %   04/12/18 0315 (!) 140/95 - - - - - 93 %   04/12/18 0300 (!) 153/106 - - - - - 93 %   04/12/18 0255 - - - - - - 95 %   04/12/18 0250 - - - - - - 97 %   04/12/18 0245 152/87 - - - - - 94 %   04/12/18 0230 (!) 151/99 - - - - - 92 %   04/12/18 0215 (!) 157/94 - - - - - -   04/12/18 0200 (!) 147/98 - - - - - 93 %   04/12/18 0145 (!) 145/98 - - - - - 95 %   04/12/18 0130 142/90 - - - - - -   04/12/18 0115 153/71 - - - - - 95 %   04/12/18 0100 (!) 158/112 - - - - - -   04/12/18 0045 (!) 154/94 - - - - - 99 %   04/12/18 0030 150/90 - - - - - 91 %   04/12/18 0015 (!) 160/91 - - - - - -   04/12/18 0000 (!) 157/96 - - - - - 97 %   04/11/18 2353 152/90 99.7  F (37.6  C) Temporal 94 94 18 95 %       Physical Exam  Constitutional:  Appears intoxicated.  Twitching.   Confused.  HENT:   Head:    Normocephalic.   Mouth/Throat:   Oropharynx is clear and moist.   Eyes:    EOM are normal. Pupils are equal, round, and reactive to light.   Neck:    Neck supple.   Cardiovascular:  Normal rate, regular rhythm and normal heart sounds.      Exam reveals no gallop and no friction rub.       No murmur heard.  Pulmonary/Chest:  Effort normal and breath sounds normal.      No respiratory distress. No wheezes. No rales.      No reproducible chest wall pain.  Abdominal:   Soft. No distension. No tenderness. No rebound and no guarding.   Musculoskeletal:  Normal range of motion.   Neurological:   Awake and alert.  Patient is not oriented.  He is confused.  Appears intoxicated.     Cranial nerves grossly intact.          Moves all 4 extremities spontaneously    Skin:    No rash noted. No pallor.    Psych:   Anxious. Confused. Questionable auditory hallucinations.    Emergency Department Course   ECG:  @ 2355  Indication: Screening   Vent. Rate 107 bpm. NJ interval 130 ms. QRS duration 86 ms. QT/QTc 308/411 ms. P-R-T axis 56 82 71.   Sinus tachycardia. Otherwise normal ECG.    Read @ 0005 by Dr. Sawyer.     Laboratory:  Blood:  CBC:  WBC 6.6, HGB 14.9, , otherwise WNL  CMP: AST 50, otherwise WNL (Creatinine 1.24)     Salicylate level: <2  Acetaminophen level: <2    EtOH: <0.01     Urine:  Drug abuse screen 77 urine: POSITIVE for Amphetamines and Cannabinoids, otherwise negative.     Interventions:  (0030) Normal Saline, 1 liter, IV bolus   (0218) Ativan, 1 mg, IV injection     Emergency Department Course:  Nursing notes and vitals reviewed.    (2350) I entered the room with my scribe, obtained the history, and performed an exam of the patient as documented above.    EKG was done, interpretation as above.     A peripheral IV was established. Blood was drawn from the patient. This was sent for laboratory testing, findings above.      Urine sample was obtained and sent for laboratory analysis,  findings above.     (0135) I went to check in on the patient who is still acting oddly but is more calm overall. The patient now admits to consuming meth earlier.      (0443) I contacted DEC regarding the patient. They agreed to see the patient but he kept falling asleep during their evaluation. Will have to plan on reeval when more sober.     (0505) I reevaluated the patient. He is sleeping    The care of the patient will be signed out to the oncoming physician pending sobriety.    Impression & Plan    Medical Decision Making:  Solomon Dubon is a 22-year-old male here for hallucinations and intoxication.  Here he had odd movements, is not coherent with possible auditory hallucinations.  He does admit to methamphetamine use which is likely the cause, although I am unsure of underlying psychosis.  I attempted DEC consultation and the patient was sleeping and not coherent for evaluation.  He is not safe for discharge back to the community until his mental health can be further reassessed.  The patient will need further sobriety.  The case has been signed out to Dr. Talamantes who will follow up on sobriety and reevaluate mental health status.    Diagnosis:    ICD-10-CM   1. Methamphetamine abuse F15.10     Disposition:  The care of the patient was signed out to the oncoming ED provider pending sobriety for mental health evaluation.        [facility] EMERGENCY DEPARTMENT      Scribe disclosure:  I, Carlos Alberto Whaley, am serving as a scribe on 4/11/2018 at 11:50 PM to personally document services performed by Subhash Sawyer MD, based on my observations and the provider's statements to me.                 Subhash Sawyer MD  04/12/18 3231

## 2019-11-18 ENCOUNTER — HOSPITAL ENCOUNTER (EMERGENCY)
Facility: CLINIC | Age: 24
Discharge: HOME | End: 2019-11-18
Attending: EMERGENCY MEDICINE | Admitting: EMERGENCY MEDICINE
Payer: COMMERCIAL

## 2019-11-18 VITALS
TEMPERATURE: 97 F | SYSTOLIC BLOOD PRESSURE: 110 MMHG | HEART RATE: 75 BPM | DIASTOLIC BLOOD PRESSURE: 76 MMHG | OXYGEN SATURATION: 98 % | RESPIRATION RATE: 18 BRPM

## 2019-11-18 DIAGNOSIS — R46.89 AGGRESSIVE BEHAVIOR: ICD-10-CM

## 2019-11-18 DIAGNOSIS — F19.10 SUBSTANCE ABUSE (H): ICD-10-CM

## 2019-11-18 LAB — GLUCOSE BLDC GLUCOMTR-MCNC: 83 MG/DL (ref 70–99)

## 2019-11-18 PROCEDURE — 00000146 ZZHCL STATISTIC GLUCOSE BY METER IP

## 2019-11-18 PROCEDURE — 99283 EMERGENCY DEPT VISIT LOW MDM: CPT

## 2019-11-18 NOTE — ED NOTES
Bed: [facility]  Expected date:   Expected time:   Means of arrival:   Comments:  511  24 M hold/combative/medicated/now sleeping  1543

## 2019-11-18 NOTE — LETTER
November 18, 2019      To Whom It May Concern:      Solomon Dubon was seen in our Emergency Department today, 11/18/19.  His condition has improved. He may return to work.       Sincerely,        Albertina Waterman RN

## 2019-11-18 NOTE — ED AVS SNAPSHOT
Emergency Department  [address] [place] [id number]  Phone:  [phone]  Fax:  [phone]                                    Solomon Dubon   MRN: [id number]    Department:   Emergency Department   Date of Visit:  11/18/2019           After Visit Summary Signature Page    I have received my discharge instructions, and my questions have been answered. I have discussed any challenges I see with this plan with the nurse or doctor.    ..........................................................................................................................................  Patient/Patient Representative Signature      ..........................................................................................................................................  Patient Representative Print Name and Relationship to Patient    ..................................................               ................................................  Date                                   Time    ..........................................................................................................................................  Reviewed by Signature/Title    ...................................................              ..............................................  Date                                               Time          22EPIC Rev 08/18

## 2019-11-18 NOTE — ED PROVIDER NOTES
History     Chief Complaint:  Substance Abuse    HPI   Solomon Dubon is a 24 year old male who presents with substance abuse and aggressive behavior.  He has a meth and alcohol history.  Apparently he showed up to his mom's house tonight and was yelling at her, making her threats.  She subsequently called 911.  When police arrived, per the report, he shoved them.  This prompted his being restrained and giving IM Versed.  At time of this note he is sedated and unable to provide additional history.    Allergies:  No Known Allergies     Medications:    No current outpatient medications on file.    Past Medical History:    Meth abuse  ETOH abuse    Past Surgical History:    No past surgical history on file.     Family History:    family history is not on file.    Social History:  Meth abuse  ETOH use  PCP: No Ref-Primary, Physician     Review of Systems  Unable to Assess - AMS    Physical Exam     Patient Vitals for the past 24 hrs:   BP Temp Temp src Pulse Heart Rate Resp SpO2   11/18/19 2158 110/76 -- -- -- 80 18 98 %   11/18/19 1850 -- -- -- -- 72 18 100 %   11/18/19 1845 102/65 -- -- 75 78 19 98 %   11/18/19 1831 -- -- -- -- 77 19 100 %   11/18/19 1830 99/61 -- -- 72 -- -- --   11/18/19 1815 98/63 -- -- 74 70 18 100 %   11/18/19 1610 -- -- -- -- 73 19 97 %   11/18/19 1600 92/54 -- -- 72 74 20 97 %   11/18/19 1556 101/78 97  F (36.1  C) Temporal 74 -- -- 100 %        Physical Exam  General/Appearance: appears stated age, well-groomed, appears comfortable but sedated and sleeping, protecting airways, odor of ETOH  Eyes: EOMI, no scleral injection, no icterus  ENT: MMM  Neck: supple, nl ROM, no stiffness  Cardiovascular: RRR, nl S1S2, no m/r/g, 2+ pulses in all 4 extremities, cap refill <2sec  Respiratory: CTAB, good air movement throughout, no wheezes/rhonchi/rales, no increased WOB, no retractions  Back: no lesions  GI: abd soft, non-distended, nttp,  no HSM, no rebound, no guarding, nl BS  MSK: SOSA, good tone, no  bony abnormality  Skin: warm and well-perfused, no rash, no edema, no ecchymosis, nl turgor  Neuro: sedated, sleeping  Psych: deferred  Heme: no petechia, no purpura, no active bleeding      Emergency Department Course   Laboratory:  Glu 83Emergency Department Course:  Past medical records, nursing notes, and vitals reviewed.  I performed an exam of the patient and obtained history, as documented above.    2200 I rechecked the patient.  He is alert, oriented, walking without ataxia, talking without slurred speech.  We discussed what happened.  He states that he currently lives with his mom so he does not understand why she called the police.    Impression & Plan      Medical Decision Making:  This patient is a 24-year-old male with history of substance abuse who I suspect was using meth at night but was using alcohol.  He became belligerent and aggressive with his mom, per her report, prompting 911 to be called.  He then became physically aggressive with police, prompting them to restrain him and give him IM Versed.  He initially here was sedated however cleared appropriately.  There is no evidence of trauma.  Once cleared he was sober acting.  There is no evidence of SI, HI.  Although he has less insight into the situation there is no reason to hold him or have mental health assessed him.  He will be discharged home.  Diagnosis:    ICD-10-CM    1. Aggressive behavior R46.89    2. Substance abuse (H) F19.10         11/18/2019   Kimber Hoffman*        Kimber Hoffman MD  11/18/19 3802

## 2019-11-18 NOTE — ED NOTES
Pt somnolent upon arrival to ED, not awakening to voice.  Vital signs unremarkable, pt removed from EMS restraints upon arrival.  Pt changed into  scrubs and personal items secured.

## 2019-11-18 NOTE — ED TRIAGE NOTES
Pt presents to ED on a [place] in restraints.  Per EMS, pt showed up at his mothers house and started screaming prompting her to call PD.  Pt pushed PD and was unable to hold a conversation with them.  Pt given 5 mg IM versed and restrained  by EMS due to aggressive behavior.

## 2019-11-19 NOTE — ED NOTES
Mother of pt, Stephanie, called the writer.  States she believes the pt is using meth, and states she currently does not feel safe for him to come back to her home.

## 2021-07-29 ENCOUNTER — HOSPITAL ENCOUNTER (EMERGENCY)
Facility: CLINIC | Age: 26
Discharge: HOME | End: 2021-07-29
Attending: FAMILY MEDICINE | Admitting: FAMILY MEDICINE
Payer: MEDICAID

## 2021-07-29 VITALS
RESPIRATION RATE: 18 BRPM | SYSTOLIC BLOOD PRESSURE: 129 MMHG | DIASTOLIC BLOOD PRESSURE: 64 MMHG | OXYGEN SATURATION: 98 % | HEART RATE: 68 BPM | TEMPERATURE: 97.9 F

## 2021-07-29 DIAGNOSIS — F22 PARANOIA (H): ICD-10-CM

## 2021-07-29 DIAGNOSIS — F29 PSYCHOSIS, UNSPECIFIED PSYCHOSIS TYPE (H): ICD-10-CM

## 2021-07-29 DIAGNOSIS — F15.10 METHAMPHETAMINE ABUSE (H): ICD-10-CM

## 2021-07-29 PROCEDURE — 99285 EMERGENCY DEPT VISIT HI MDM: CPT | Performed by: FAMILY MEDICINE

## 2021-07-29 PROCEDURE — 99285 EMERGENCY DEPT VISIT HI MDM: CPT | Mod: 25 | Performed by: FAMILY MEDICINE

## 2021-07-29 PROCEDURE — 90791 PSYCH DIAGNOSTIC EVALUATION: CPT

## 2021-07-29 NOTE — DISCHARGE INSTRUCTIONS
Patient will be discharged from the emergency room on his request with plans to follow-up with his outpatient providers.  Patient plans on going to the [facility] for safety.

## 2021-07-29 NOTE — ED PROVIDER NOTES
"    [facility] EMERGENCY DEPARTMENT ([facility])   July 29, 2021  History     Chief Complaint   Patient presents with     Psychiatric Evaluation     Patient states he is here for hunger, and treatment to maintain focus     The history is provided by the patient and medical records.     Solomon Dubon is a 25 year old male with PMH significant for homelessness who presents to the Emergency Department for psychiatric evaluation.  Patient was seen by mental health  Sania at 11:40 AM.  Patient appears very nervous and paranoid and refuses a urine drug sample.  Patient was very guarded, but eventually admitted that he has been using methamphetamine recently.  Patient denies SI/HI.  Patient reports he has had several recent ED visits.  He indicates that the only reason he came in was for a Covid test, but will not tell why he thinks he has Covid.  Patient states that he is seeing a psychiatrist tomorrow morning, but will not tell who it is with or where it is.  Patient has fragmented thoughts and at one point during the evaluation was distracted and said \"there is multiple people on the video iPad\". Patient does not have a safety plan and will not tell where he plans to sleep tonight.        PAST MEDICAL HISTORY: History reviewed. No pertinent past medical history.    PAST SURGICAL HISTORY: History reviewed. No pertinent surgical history.    Past medical history, past surgical history, medications, and allergies were reviewed with the patient. Additional pertinent items: None    FAMILY HISTORY: History reviewed. No pertinent family history.    SOCIAL HISTORY:   Social History     Tobacco Use     Smoking status: Current Every Day Smoker     Packs/day: 1.50     Types: Cigarettes     Smokeless tobacco: Never Used   Substance Use Topics     Alcohol use: Not Currently     Social history was reviewed with the patient. Additional pertinent items: None      There are no discharge medications for this patient.       " No Known Allergies     Review of Systems  A complete review of systems was attempted but limited due to paranoia.    Physical Exam   BP: 129/64  Pulse: 68  Temp: 97.9  F (36.6  C)  Resp: 18  SpO2: 98 %      Physical Exam  Constitutional:       General: He is not in acute distress.     Appearance: He is not diaphoretic.   HENT:      Head: Atraumatic.   Eyes:      General: No scleral icterus.     Pupils: Pupils are equal, round, and reactive to light.   Cardiovascular:      Heart sounds: Normal heart sounds.   Pulmonary:      Effort: No respiratory distress.      Breath sounds: Normal breath sounds.   Abdominal:      General: Bowel sounds are normal.      Palpations: Abdomen is soft.      Tenderness: There is no abdominal tenderness.   Musculoskeletal:         General: No tenderness.   Skin:     General: Skin is warm.      Findings: No rash.   Neurological:      General: No focal deficit present.      Motor: No weakness.      Coordination: Coordination normal.   Psychiatric:         Mood and Affect: Mood is anxious.         Speech: Speech is tangential.         Thought Content: Thought content is paranoid and delusional. Thought content does not include homicidal or suicidal ideation.         ED Course        Procedures       The medical record was reviewed and interpreted.          Patient was seen and evaluated by the  please refer to their documentation in the note section of the epic chart dated 7/29/2021                   Assessments & Plan (with Medical Decision Making)         I have reviewed the nursing notes.    I have reviewed the findings, diagnosis, plan and need for follow up with the patient.    Patient with ongoing paranoia likely psychosis at this time however patient is not at risk of harming himself or others denies any suicidal or homicidal thoughts he is tangential in his thought process but do not believe that he is able to be held against his well and at this time he is requesting  discharge from the emergency room patient will be discharged with plans to follow-up with outpatient providers.    Final diagnoses:   Paranoia (H)   Psychosis, unspecified psychosis type (H)   Methamphetamine abuse (H)       IYovany, am serving as a trained medical scribe to document services personally performed by Sy Shen MD based on the provider's statements to me on July 29, 2021.  This document has been checked and approved by the attending provider.    ISy MD, was physically present and have reviewed and verified the accuracy of this note documented by Yovany Brooks, medical scribe.       Sy Shen MD    7/29/2021   [facility] EMERGENCY DEPARTMENT     Sy Shen MD  07/29/21 1916